# Patient Record
Sex: MALE | Race: AMERICAN INDIAN OR ALASKA NATIVE
[De-identification: names, ages, dates, MRNs, and addresses within clinical notes are randomized per-mention and may not be internally consistent; named-entity substitution may affect disease eponyms.]

---

## 2019-07-21 ENCOUNTER — HOSPITAL ENCOUNTER (EMERGENCY)
Dept: HOSPITAL 5 - ED | Age: 74
Discharge: HOME | End: 2019-07-21
Payer: MEDICARE

## 2019-07-21 VITALS — SYSTOLIC BLOOD PRESSURE: 159 MMHG | DIASTOLIC BLOOD PRESSURE: 97 MMHG

## 2019-07-21 DIAGNOSIS — Z79.899: ICD-10-CM

## 2019-07-21 DIAGNOSIS — Z88.8: ICD-10-CM

## 2019-07-21 DIAGNOSIS — F17.200: ICD-10-CM

## 2019-07-21 DIAGNOSIS — Z98.890: ICD-10-CM

## 2019-07-21 DIAGNOSIS — L03.115: Primary | ICD-10-CM

## 2019-07-21 DIAGNOSIS — Z88.0: ICD-10-CM

## 2019-07-21 DIAGNOSIS — I10: ICD-10-CM

## 2019-07-21 DIAGNOSIS — E11.9: ICD-10-CM

## 2019-07-21 PROCEDURE — 90471 IMMUNIZATION ADMIN: CPT

## 2019-07-21 PROCEDURE — 96372 THER/PROPH/DIAG INJ SC/IM: CPT

## 2019-07-21 PROCEDURE — 99282 EMERGENCY DEPT VISIT SF MDM: CPT

## 2019-07-21 PROCEDURE — 90715 TDAP VACCINE 7 YRS/> IM: CPT

## 2019-07-21 NOTE — EMERGENCY DEPARTMENT REPORT
- General


Chief complaint: Wound/Laceration


Stated complaint: RT LEG INJURY


Time Seen by Provider: 07/21/19 17:01


Source: patient


Mode of arrival: Ambulatory


Limitations: No Limitations





- History of Present Illness


Initial comments: 





This is a 73-year-old male nontoxic, well nourished in appearance, no acute 

signs of distress presents to the ED with c/o of redness and pain to right leg 

area.  Patient stated he may of been bitten by an insect.  Patient denies any 

pus or drainage.  Patient denies any fever, chills, nausea, vomiting, chest 

pain, shortness of breath, headache or stiff neck.  Patient stated allergies to 

lisinopril and penicillin with past medical history of diabetes and 

hypertension.


MD complaint: insect bite/sting


-: days(s)


Tetanus Up to Date: unsure


Location: [facility]


Severity: mild


Severity scale (0 -10): 3


Quality: aching


Consistency: constant


Improves with: none


Worsens with: none


Associated symptoms: denies other symptoms


Treatments Prior to Arrival: none





- Related Data


                                  Previous Rx's











 Medication  Instructions  Recorded  Last Taken  Type


 


Acetaminophen/Codeine [Tylenol 1 tab PO Q6H PRN #12 tab 07/21/19 Unknown Rx





/Codeine # 3 tab]    


 


Clindamycin [Clindamycin CAP] 300 mg PO Q8H #21 cap 07/21/19 Unknown Rx











                                    Allergies











Allergy/AdvReac Type Severity Reaction Status Date / Time


 


lisinopril AdvReac  Nausea Verified 07/21/19 16:13


 


Penicillins AdvReac  Swelling Verified 07/21/19 16:13














Abscess Boil HPI





- HPI


Chief Complaint: Wound/Laceration


Stated Complaint: RT LEG INJURY


Time Seen by Provider: 07/21/19 17:01


Home Medications: 


                                  Previous Rx's











 Medication  Instructions  Recorded  Last Taken  Type


 


Acetaminophen/Codeine [Tylenol 1 tab PO Q6H PRN #12 tab 07/21/19 Unknown Rx





/Codeine # 3 tab]    


 


Clindamycin [Clindamycin CAP] 300 mg PO Q8H #21 cap 07/21/19 Unknown Rx











Allergies/Adverse Reactions: 


                                    Allergies











Allergy/AdvReac Type Severity Reaction Status Date / Time


 


lisinopril AdvReac  Nausea Verified 07/21/19 16:13


 


Penicillins AdvReac  Swelling Verified 07/21/19 16:13














ED Review of Systems


ROS: 


Stated complaint: RT LEG INJURY


Other details as noted in HPI





Constitutional: denies: chills, fever


Eyes: denies: eye pain, eye discharge, vision change


ENT: denies: ear pain, throat pain


Respiratory: denies: cough, shortness of breath, wheezing


Cardiovascular: denies: chest pain, palpitations


Endocrine: no symptoms reported


Gastrointestinal: denies: abdominal pain, nausea, diarrhea


Genitourinary: denies: urgency, dysuria


Musculoskeletal: denies: back pain, joint swelling, arthralgia


Skin: denies: rash, lesions


Neurological: denies: headache, weakness, paresthesias


Psychiatric: denies: anxiety, depression


Hematological/Lymphatic: denies: easy bleeding, easy bruising





ED Past Medical Hx





- Past Medical History


Previous Medical History?: Yes


Hx Hypertension: Yes


Hx Diabetes: Yes





- Surgical History


Additional Surgical History: bypass, left leg





- Social History


Smoking Status: Current Every Day Smoker


Substance Use Type: None





- Medications


Home Medications: 


                                Home Medications











 Medication  Instructions  Recorded  Confirmed  Last Taken  Type


 


Acetaminophen/Codeine [Tylenol 1 tab PO Q6H PRN #12 tab 07/21/19  Unknown Rx





/Codeine # 3 tab]     


 


Clindamycin [Clindamycin CAP] 300 mg PO Q8H #21 cap 07/21/19  Unknown Rx














ED Physical Exam





- General


Limitations: No Limitations


General appearance: alert, in no apparent distress





- Head


Head exam: Present: atraumatic, normocephalic





- Extremities Exam


Extremities exam: Present: normal inspection, full ROM, tenderness, normal capil

philip refill.  Absent: joint swelling





- Expanded Lower Extremity Exam


  ** Right


Hip exam: Present: normal inspection, full ROM.  Absent: tenderness, swelling


Upper Leg exam: Present: normal inspection, full ROM.  Absent: tenderness, 

swelling


Knee exam: Present: normal inspection, full ROM.  Absent: tenderness, swelling


Lower Leg exam: Present: normal inspection, full ROM, tenderness, erythema.  

Absent: swelling, abrasion, laceration, ecchymosis, deformity, crepidus, dis

location, palpable cord, Juaquin's sign


Ankle exam: Present: normal inspection, full ROM.  Absent: tenderness, swelling


Foot/Toe exam: Present: normal inspection, full ROM.  Absent: tenderness, 

swelling


Neuro vascular tendon exam: Present: no vascular compromise


Gait: Positive: observed and normal





                            __________________________














                            __________________________





 1 - 3 cm x 3 cm of erythema with slight warmth to touch.  No abscess formation.

 No stranding.








- Back Exam


Back exam: Present: normal inspection, full ROM





- Neurological Exam


Neurological exam: Present: alert, oriented X3, normal gait





- Psychiatric


Psychiatric exam: Present: normal affect, normal mood





- Skin


Skin exam: Present: warm, dry, intact, normal color.  Absent: rash





ED Course





                                   Vital Signs











  07/21/19





  16:12


 


Temperature 97.7 F


 


Pulse Rate 87


 


Respiratory 16





Rate 


 


Blood Pressure 159/97


 


O2 Sat by Pulse 94





Oximetry 














- Reevaluation(s)


Reevaluation #1: 





07/21/19 17:44


Patient is speaking in full sentences with no signs of distress noted.





ED Medical Decision Making





- Medical Decision Making





This is a 73-year-old male that presents with cellulitis.  Patient is stable and

was examined by me.  There is no induration, fluctuance.  No signs of abscess 

formation.  The area has been outlined with a permanent marker and patient was 

instructed to observe symptoms of increased redness or swelling and to return to

the ER if this does occur.  I will discharge patient with clinda.  The patient 

did receive clindamycin IM in the ER.    Patient did receive a tetanus booster 

in the ER.  Patient was referred to Follow-up with a primary care doctor in 3-5 

days or if symptoms worsen and continue return to emergency room as soon as 

possible.  At time of discharge, the patient does not seem toxic or ill in 

appearance.  No acute signs of distress noted.  Patient agrees to discharge 

treatment plan of care.  No further questions noted by the patient.


Critical care attestation.: 


If time is entered above; I have spent that time in minutes in the direct care 

of this critically ill patient, excluding procedure time.








ED Disposition


Clinical Impression: 


Cellulitis


Qualifiers:


 Site of cellulitis: extremity Site of cellulitis of extremity: lower extremity 

Laterality: right Qualified Code(s): L03.115 - Cellulitis of right lower limb





Disposition: DC-01 TO HOME OR SELFCARE


Is pt being admited?: No


Does the pt Need Aspirin: No


Condition: Stable


Instructions:  Cellulitis (ED)


Additional Instructions: 


Follow-up with a primary care doctor in 3-5 days or if symptoms worsen and 

continue return to emergency room as soon as possible. 


Prescriptions: 


Clindamycin [Clindamycin CAP] 300 mg PO Q8H #21 cap


Acetaminophen/Codeine [Tylenol /Codeine # 3 tab] 1 tab PO Q6H PRN #12 tab


 PRN Reason: Pain , Severe (7-10)


Referrals: 


PRIMARY CARE,MD [Referring] - 3-5 Days


ARIELLE TORRES MD [Staff Physician] - 3-5 Days


[facility] [place] [Outside] - 3-5 Days


[facility] [Outside] - 3-5 Days